# Patient Record
Sex: FEMALE | HISPANIC OR LATINO | Employment: FULL TIME | URBAN - METROPOLITAN AREA
[De-identification: names, ages, dates, MRNs, and addresses within clinical notes are randomized per-mention and may not be internally consistent; named-entity substitution may affect disease eponyms.]

---

## 2022-02-12 ENCOUNTER — HOSPITAL ENCOUNTER (EMERGENCY)
Facility: CLINIC | Age: 57
Discharge: HOME | End: 2022-02-12
Attending: EMERGENCY MEDICINE | Admitting: EMERGENCY MEDICINE
Payer: COMMERCIAL

## 2022-02-12 ENCOUNTER — APPOINTMENT (OUTPATIENT)
Dept: CT IMAGING | Facility: CLINIC | Age: 57
End: 2022-02-12
Attending: EMERGENCY MEDICINE
Payer: COMMERCIAL

## 2022-02-12 VITALS
OXYGEN SATURATION: 98 % | RESPIRATION RATE: 18 BRPM | SYSTOLIC BLOOD PRESSURE: 135 MMHG | DIASTOLIC BLOOD PRESSURE: 79 MMHG | TEMPERATURE: 97 F | HEART RATE: 66 BPM

## 2022-02-12 DIAGNOSIS — R10.13 EPIGASTRIC PAIN: ICD-10-CM

## 2022-02-12 LAB
ALBUMIN SERPL-MCNC: 3.8 G/DL (ref 3.4–5)
ALP SERPL-CCNC: 119 U/L (ref 40–150)
ALT SERPL W P-5'-P-CCNC: 41 U/L (ref 0–50)
ANION GAP SERPL CALCULATED.3IONS-SCNC: <1 MMOL/L (ref 3–14)
AST SERPL W P-5'-P-CCNC: 68 U/L (ref 0–45)
BASOPHILS # BLD AUTO: 0.1 10E3/UL (ref 0–0.2)
BASOPHILS NFR BLD AUTO: 1 %
BILIRUB DIRECT SERPL-MCNC: 0.2 MG/DL (ref 0–0.2)
BILIRUB SERPL-MCNC: 0.4 MG/DL (ref 0.2–1.3)
BUN SERPL-MCNC: 14 MG/DL (ref 7–30)
CALCIUM SERPL-MCNC: 9.2 MG/DL (ref 8.5–10.1)
CHLORIDE BLD-SCNC: 106 MMOL/L (ref 94–109)
CO2 SERPL-SCNC: 29 MMOL/L (ref 20–32)
CREAT SERPL-MCNC: 0.73 MG/DL (ref 0.52–1.04)
EOSINOPHIL # BLD AUTO: 0.1 10E3/UL (ref 0–0.7)
EOSINOPHIL NFR BLD AUTO: 1 %
ERYTHROCYTE [DISTWIDTH] IN BLOOD BY AUTOMATED COUNT: 12.3 % (ref 10–15)
GFR SERPL CREATININE-BSD FRML MDRD: >90 ML/MIN/1.73M2
GLUCOSE BLD-MCNC: 157 MG/DL (ref 70–99)
HCT VFR BLD AUTO: 40.8 % (ref 35–47)
HGB BLD-MCNC: 13.2 G/DL (ref 11.7–15.7)
IMM GRANULOCYTES # BLD: 0 10E3/UL
IMM GRANULOCYTES NFR BLD: 0 %
LYMPHOCYTES # BLD AUTO: 2 10E3/UL (ref 0.8–5.3)
LYMPHOCYTES NFR BLD AUTO: 26 %
MCH RBC QN AUTO: 29.3 PG (ref 26.5–33)
MCHC RBC AUTO-ENTMCNC: 32.4 G/DL (ref 31.5–36.5)
MCV RBC AUTO: 91 FL (ref 78–100)
MONOCYTES # BLD AUTO: 0.4 10E3/UL (ref 0–1.3)
MONOCYTES NFR BLD AUTO: 6 %
NEUTROPHILS # BLD AUTO: 5.1 10E3/UL (ref 1.6–8.3)
NEUTROPHILS NFR BLD AUTO: 66 %
NRBC # BLD AUTO: 0 10E3/UL
NRBC BLD AUTO-RTO: 0 /100
PLATELET # BLD AUTO: 210 10E3/UL (ref 150–450)
POTASSIUM BLD-SCNC: 4.4 MMOL/L (ref 3.4–5.3)
PROT SERPL-MCNC: 7.2 G/DL (ref 6.8–8.8)
RBC # BLD AUTO: 4.5 10E6/UL (ref 3.8–5.2)
SODIUM SERPL-SCNC: 135 MMOL/L (ref 133–144)
TROPONIN I SERPL HS-MCNC: 4 NG/L
TROPONIN I SERPL HS-MCNC: 5 NG/L
WBC # BLD AUTO: 7.7 10E3/UL (ref 4–11)

## 2022-02-12 PROCEDURE — 84484 ASSAY OF TROPONIN QUANT: CPT | Mod: 91 | Performed by: EMERGENCY MEDICINE

## 2022-02-12 PROCEDURE — 93005 ELECTROCARDIOGRAM TRACING: CPT

## 2022-02-12 PROCEDURE — 36415 COLL VENOUS BLD VENIPUNCTURE: CPT | Performed by: EMERGENCY MEDICINE

## 2022-02-12 PROCEDURE — 74176 CT ABD & PELVIS W/O CONTRAST: CPT

## 2022-02-12 PROCEDURE — 82435 ASSAY OF BLOOD CHLORIDE: CPT | Performed by: EMERGENCY MEDICINE

## 2022-02-12 PROCEDURE — 82248 BILIRUBIN DIRECT: CPT | Performed by: EMERGENCY MEDICINE

## 2022-02-12 PROCEDURE — 99285 EMERGENCY DEPT VISIT HI MDM: CPT | Mod: 25

## 2022-02-12 PROCEDURE — 85004 AUTOMATED DIFF WBC COUNT: CPT | Performed by: EMERGENCY MEDICINE

## 2022-02-12 RX ORDER — IOPAMIDOL 755 MG/ML
500 INJECTION, SOLUTION INTRAVASCULAR ONCE
Status: DISCONTINUED | OUTPATIENT
Start: 2022-02-12 | End: 2022-02-12 | Stop reason: CLARIF

## 2022-02-12 ASSESSMENT — ENCOUNTER SYMPTOMS
NAUSEA: 0
FEVER: 0
VOMITING: 0
DIAPHORESIS: 1
BACK PAIN: 1

## 2022-02-12 NOTE — ED TRIAGE NOTES
Pt arrives with c/o upper abdominal pain and chest pain that radiates into back that suddenly started at 1300 while at work. Pt reports gallbladder removal about 20 years ago. Pt denies nausea or vomiting. ABCs intact.

## 2022-02-12 NOTE — ED PROVIDER NOTES
History   Chief Complaint:  Abdominal Pain and Chest Pain       HPI   Janay Caldera is a 56 year old female with history of a cholecystectomy who presents with chest and back pain. 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 This episode was constant for 2 hours and her symptoms have slowly been improving since coming to the ED. She has never had symptoms this intense in the past and denies any heart issues. She states that she had her gallbladder removed and symptoms feel slightly similar. Also denies any nausea, fever, vomiting, or tobacco use. She is currently taking medications for blood pressure and cholesterol.     Review of Systems   Constitutional: Positive for diaphoresis. Negative for fever.   Cardiovascular: Positive for chest pain.   Gastrointestinal: Negative for nausea and vomiting.   Musculoskeletal: Positive for back pain.   All other systems reviewed and are negative.        Allergies:  Hydrocodone  Codeine    Medications:  Metformin   Metoprolol   Naproxen   Norvasc   Omeprazole   Pravastatin   Simethicone    Estradiol     Past Medical History:     Diabetes mellitus   Thyroid disease   Hypertension   Ovarian cyst   GERD   Vaginal atrophy   Asthma   Hiatal hernia   Hyperlipidemia   Myopia     Past Surgical History:    Tubal ligation   Cholecystectomy   Colonoscopy   Left wrist fracture repair   Finger trigger release   Carpal tunnel release     Family History:    Hypertension   Diabetes   Cataract   Stroke   Heart disease   Hyperlipidemia   Breast cancer    Social History:  Patient presents with her    Denies tobacco use     Physical Exam     Patient Vitals for the past 24 hrs:   BP Temp Temp src Pulse Resp SpO2   02/12/22 1745 135/79 -- -- 66 13 98 %   02/12/22 1730 125/89 -- -- 63 11 96 %   02/12/22 1615 130/86 -- -- 69 -- 95 %   02/12/22 1600 137/84 -- -- 69 --  94 %   02/12/22 1545 (!) 147/83 -- -- 66 -- 97 %   02/12/22 1530 (!) 142/87 -- -- 61 20 97 %   02/12/22 1500 137/82 -- -- 62 -- 96 %   02/12/22 1448 (!) 149/84 97  F (36.1  C) Temporal 62 18 99 %       Physical Exam  Gen: well appearing, in no acute distress  Oral : Mucous membranes moist,   Nose: No rhinorhea  Ears: External near normal, without drainage  Eyes: periorbital tissues and sclera normal   Neck: supple, no abnormal swelling  Lungs:  CTAB,  no resp distress, speaks full sentences  CV: Regular rate, regular rhythm  Abd: epigastric discomfort, soft, nontender, nondistended, no rebound/guarding  Ext: no lower extremity edema  Skin: warm, dry, well perfused, no rashes/bruising/lesions on exposed skin  Neuro: alert, no gross motor or sensory deficits,   Psych: pleasant mood, normal affect    Emergency Department Course   ECG  ECG obtained at 1458, ECG read at 1505  Sinus bradycardia    Rate 57 bpm. NC interval 174 ms. QRS duration 78 ms. QT/QTc 416/404 ms. P-R-T axes 38 34 35.     Imaging:  CT Abdomen Pelvis w/o Contrast   Preliminary Result   IMPRESSION:    1.  No acute abnormality in the abdomen or pelvis. No cause for epigastric pain is identified.   2.  There are two nonobstructing stones in the right kidney with the largest measuring 0.3 cm.           Report per radiology    Laboratory:  Labs Ordered and Resulted from Time of ED Arrival to Time of ED Departure   BASIC METABOLIC PANEL - Abnormal       Result Value    Sodium 135      Potassium 4.4      Chloride 106      Carbon Dioxide (CO2) 29      Anion Gap <1 (*)     Urea Nitrogen 14      Creatinine 0.73      Calcium 9.2      Glucose 157 (*)     GFR Estimate >90     HEPATIC FUNCTION PANEL - Abnormal    Bilirubin Total 0.4      Bilirubin Direct 0.2      Protein Total 7.2      Albumin 3.8      Alkaline Phosphatase 119      AST 68 (*)     ALT 41     TROPONIN I - Normal    Troponin I High Sensitivity 4     TROPONIN I - Normal    Troponin I High Sensitivity 5      CBC WITH PLATELETS AND DIFFERENTIAL    WBC Count 7.7      RBC Count 4.50      Hemoglobin 13.2      Hematocrit 40.8      MCV 91      MCH 29.3      MCHC 32.4      RDW 12.3      Platelet Count 210      % Neutrophils 66      % Lymphocytes 26      % Monocytes 6      % Eosinophils 1      % Basophils 1      % Immature Granulocytes 0      NRBCs per 100 WBC 0      Absolute Neutrophils 5.1      Absolute Lymphocytes 2.0      Absolute Monocytes 0.4      Absolute Eosinophils 0.1      Absolute Basophils 0.1      Absolute Immature Granulocytes 0.0      Absolute NRBCs 0.0          Emergency Department Course:  Reviewed:  I reviewed nursing notes, vitals, past medical history and Care Everywhere    Assessments:  1523 I obtained history and examined the patient as noted above.   1707 I rechecked the patient and explained findings.   1735 I rechecked the patient and explained findings.     Consults:  1635 I spoke with radiology about the patient's findings     Disposition:  The patient was discharged to home.     Impression & Plan     CMS Diagnoses: None    Medical Decision Making:  Janay Caldera is a 56 year old female who presents to the ED with an episode of epigastric discomfort. She reports it felt like when she had her gallbladder attack years ago. The symptoms are already resolving by the time I saw her. EKG shows no acute ischemic changes. She's not at high risk for ACS with an episode of pain that has resolved. She has had two negative troponins and non ischemic bearing EKG. I think outpatient follow up is appropriate. Will encourage outpatient follow up with PCP and the next week for recheck. Patient is amendable for discharge.         Diagnosis:    ICD-10-CM    1. Epigastric pain  R10.13        Discharge Medications:  New Prescriptions    No medications on file       Scribe Disclosure:  Milvia CAM, am serving as a scribe at 2:57 PM on 2/12/2022 to document services personally performed by Yinka Mendieta  MD Chele based on my observations and the provider's statements to me.          Yinka Mendieta MD  02/12/22 1942

## 2022-02-14 LAB
ATRIAL RATE - MUSE: 57 BPM
DIASTOLIC BLOOD PRESSURE - MUSE: NORMAL MMHG
INTERPRETATION ECG - MUSE: NORMAL
P AXIS - MUSE: 38 DEGREES
PR INTERVAL - MUSE: 174 MS
QRS DURATION - MUSE: 78 MS
QT - MUSE: 416 MS
QTC - MUSE: 404 MS
R AXIS - MUSE: 34 DEGREES
SYSTOLIC BLOOD PRESSURE - MUSE: NORMAL MMHG
T AXIS - MUSE: 35 DEGREES
VENTRICULAR RATE- MUSE: 57 BPM

## 2022-05-07 ENCOUNTER — HOSPITAL ENCOUNTER (EMERGENCY)
Facility: CLINIC | Age: 57
Discharge: HOME | End: 2022-05-07
Attending: EMERGENCY MEDICINE | Admitting: EMERGENCY MEDICINE
Payer: COMMERCIAL

## 2022-05-07 VITALS
DIASTOLIC BLOOD PRESSURE: 96 MMHG | WEIGHT: 167 LBS | OXYGEN SATURATION: 98 % | BODY MASS INDEX: 29.59 KG/M2 | HEIGHT: 63 IN | HEART RATE: 81 BPM | SYSTOLIC BLOOD PRESSURE: 149 MMHG | RESPIRATION RATE: 20 BRPM | TEMPERATURE: 98.6 F

## 2022-05-07 DIAGNOSIS — G89.29 CHRONIC NECK PAIN: ICD-10-CM

## 2022-05-07 DIAGNOSIS — M54.2 CHRONIC NECK PAIN: ICD-10-CM

## 2022-05-07 PROCEDURE — 99282 EMERGENCY DEPT VISIT SF MDM: CPT

## 2022-05-07 RX ORDER — CYCLOBENZAPRINE HCL 10 MG
10 TABLET ORAL 3 TIMES DAILY PRN
Qty: 20 TABLET | Refills: 0 | Status: SHIPPED | OUTPATIENT
Start: 2022-05-07 | End: 2022-05-13

## 2022-05-07 ASSESSMENT — ENCOUNTER SYMPTOMS: NECK PAIN: 1

## 2022-05-07 NOTE — ED TRIAGE NOTES
Pt presents for evaluation of upper back pain in to the shoulders, worse on the right. Neck pain at the base of the neck and a generalized headache, described as a stabbing pain. Symptoms have been present for the last month. Tylenol and salon-pas help with symptoms, last dose of tylenol was around 30 minutes PTA. Symptoms are constant other than when they are relieved a little bit with OTC remedies. Has an appointment for PT June 2 and neurology May 16, but couldn't wait, prompting pt to come in.

## 2022-05-07 NOTE — ED PROVIDER NOTES
History     Chief Complaint:  Back Pain, Neck Pain, and Headache      HPI   Janay Caldera is a 56 year old female who presents with ongoing posterior bilateral neck pain for at least the past month.  She denies any known injury.  She's been taking acetaminophen using OTC pain patches which somewhat help with the symptoms.  The pain does not radiate into the arms and no numbness or weakness in the arms.  She denies any fevers, chest or abd pain.  She saw her PCP who referred her to PMR but she can't get in until June 2nd.      Review of Systems   Musculoskeletal: Positive for neck pain.   All other systems reviewed and are negative.        Allergies:  Hydrocodone  Codeine  Latex  Lisinopril  Meperidine    Medications:    cyclobenzaprine (FLEXERIL) 10 MG tablet  ASPIRIN ADULT LOW STRENGTH PO  CALCIUM-MAGNESIUM-VITAMIN D PO  metFORMIN (GLUCOPHAGE) 1000 MG tablet  METOCLOPRAMIDE HCL PO  metoprolol (LOPRESSOR) 50 MG tablet  NAPROXEN 500 MG OR TABS  NORVASC 5 MG OR TABS  omeprazole (PRILOSEC) 20 MG capsule  Pomegranate, [place] granatum, (POMEGRANATE PO)  PRAVASTATIN SODIUM PO  Simethicone (GAS RELIEF) 180 MG CAPS  UNABLE TO FIND         Past Medical History:   Past Surgical History:     Past Medical History:   Diagnosis Date     Abdominal pain, generalized      Diabetes mellitus (H)      Thyroid disease      Unspecified essential hypertension     Past Surgical History:   Procedure Laterality Date     COLONOSCOPY N/A 9/11/2014    Procedure: COLONOSCOPY;  Surgeon: Savana Echols MD;  Location: [facility]     HC REMOVAL GALLBLADDER      Cholecystectomy     ORTHOPEDIC SURGERY      left wrist fx-metal     ZZC LIGATE FALLOPIAN TUBE,POSTPARTUM      Tubal Ligation      Patient Active Problem List    Diagnosis Date Noted     Essential hypertension, benign 03/31/2005     Priority: Medium     Backache 10/14/2003     Priority: Medium     Problem list name updated by automated process. Provider to review       Ovarian  "cyst 10/14/2003     Priority: Medium     Problem list name updated by automated process. Provider to review       Abdominal pain, right lower quadrant 10/14/2003     Priority: Medium          Family History  Family History   Problem Relation Age of Onset     Hypertension Father      Diabetes Father         adult  onset     Hypertension Mother      Diabetes Mother         adult onset     Diabetes Sister         adult onset     Diabetes Brother         adult onset       Social History:  Presents to the ED alone by private vehicle.    Physical Exam     Patient Vitals for the past 24 hrs:   BP Temp Temp src Pulse Resp SpO2 Height Weight   05/07/22 1305 (!) 149/96 98.6  F (37  C) Oral 81 20 98 % 1.6 m (5' 3\") 75.8 kg (167 lb)       Physical Exam  Vitals and nursing note reviewed.   Constitutional:       Appearance: Normal appearance.   HENT:      Head: Normocephalic and atraumatic.      Right Ear: External ear normal.      Left Ear: External ear normal.      Nose: Nose normal.      Mouth/Throat:      Mouth: Mucous membranes are moist.   Eyes:      Extraocular Movements: Extraocular movements intact.      Conjunctiva/sclera: Conjunctivae normal.   Cardiovascular:      Rate and Rhythm: Normal rate and regular rhythm.      Heart sounds: No murmur heard.  Pulmonary:      Effort: Pulmonary effort is normal. No respiratory distress.      Breath sounds: Normal breath sounds. No wheezing, rhonchi or rales.   Musculoskeletal:         General: No deformity or signs of injury.      Cervical back: Neck supple. Muscular tenderness present. No spinous process tenderness. Decreased range of motion.   Skin:     General: Skin is warm and dry.      Findings: No rash.   Neurological:      Mental Status: She is alert and oriented to person, place, and time.      Sensory: Sensation is intact.      Motor: No weakness.   Psychiatric:         Mood and Affect: Mood normal.         Behavior: Behavior normal.           Emergency Department Course "       Imaging:  No orders to display       Laboratory:  Labs Ordered and Resulted from Time of ED Arrival to Time of ED Departure - No data to display      Emergency Department Course:       Reviewed:  I reviewed nursing notes, vitals, past history and care everywhere    Interventions:  Medications - No data to display    Disposition:  The patient was discharged to home.    Impression & Plan      Medical Decision Makin yo F with ongoing bilateral neck pain for at least a month.  She has no new changes, just not getting better.  No known trauma.  No radicular symptoms or neuro deficits.  No fevers or infectious symptoms.  Suspect arthritis and/or degenerative disk disease etc.  Pt requesting an MRI but do not feel this is emergently indicated today as there are no neuro deficits and nothing to suggest a cord or nerve root compression, infection, or malignancy.  Offered XR but she declined.  Rec combining nsaids and acetaminophen, and will rx flexeril as well.  Rec close PCP f/u and continue with plan to f/u with PMR.      Covid-19  Janay Caldera was evaluated during a global COVID-19 pandemic, which necessitated consideration that the patient might be at risk for infection with the SARS-CoV-2 virus that causes COVID-19.  Applicable protocols for evaluation were followed during the patient's care. COVID-19 was considered as part of the patient's evaluation.       Diagnosis:    ICD-10-CM    1. Chronic neck pain  M54.2     G89.29        Discharge Medications:  New Prescriptions    CYCLOBENZAPRINE (FLEXERIL) 10 MG TABLET    Take 1 tablet (10 mg) by mouth 3 times daily as needed for muscle spasms              Michael Mendez MD  [phone]

## 2022-05-07 NOTE — DISCHARGE INSTRUCTIONS
Continue Tylenol and ibuprofen.  You can try the muscle relaxant (cyclobenzaprine) as need but it might make you sleepy.      Follow up with your doctor this week.

## 2025-04-14 ENCOUNTER — ANESTHESIA EVENT (OUTPATIENT)
Dept: SURGERY | Facility: CLINIC | Age: 60
End: 2025-04-14
Payer: COMMERCIAL

## 2025-04-14 ENCOUNTER — HOSPITAL ENCOUNTER (OUTPATIENT)
Facility: CLINIC | Age: 60
Discharge: HOME | End: 2025-04-14
Attending: STUDENT IN AN ORGANIZED HEALTH CARE EDUCATION/TRAINING PROGRAM | Admitting: INTERNAL MEDICINE
Payer: COMMERCIAL

## 2025-04-14 ENCOUNTER — APPOINTMENT (OUTPATIENT)
Dept: CT IMAGING | Facility: CLINIC | Age: 60
End: 2025-04-14
Attending: OTOLARYNGOLOGY
Payer: COMMERCIAL

## 2025-04-14 ENCOUNTER — APPOINTMENT (OUTPATIENT)
Dept: GENERAL RADIOLOGY | Facility: CLINIC | Age: 60
End: 2025-04-14
Attending: STUDENT IN AN ORGANIZED HEALTH CARE EDUCATION/TRAINING PROGRAM
Payer: COMMERCIAL

## 2025-04-14 ENCOUNTER — PREP FOR PROCEDURE (OUTPATIENT)
Dept: SURGERY | Facility: CLINIC | Age: 60
End: 2025-04-14
Payer: COMMERCIAL

## 2025-04-14 ENCOUNTER — ANESTHESIA (OUTPATIENT)
Dept: SURGERY | Facility: CLINIC | Age: 60
End: 2025-04-14
Payer: COMMERCIAL

## 2025-04-14 ENCOUNTER — HOSPITAL ENCOUNTER (OUTPATIENT)
Facility: CLINIC | Age: 60
End: 2025-04-14
Attending: OTOLARYNGOLOGY | Admitting: OTOLARYNGOLOGY
Payer: COMMERCIAL

## 2025-04-14 DIAGNOSIS — T18.108A FOREIGN BODY IN ESOPHAGUS, INITIAL ENCOUNTER: Primary | ICD-10-CM

## 2025-04-14 DIAGNOSIS — T18.108A ESOPHAGEAL FOREIGN BODY, INITIAL ENCOUNTER: Primary | ICD-10-CM

## 2025-04-14 LAB
GLUCOSE BLDC GLUCOMTR-MCNC: 125 MG/DL (ref 70–99)
UPPER EUS: NORMAL

## 2025-04-14 PROCEDURE — 70490 CT SOFT TISSUE NECK W/O DYE: CPT

## 2025-04-14 PROCEDURE — 272N000001 HC OR GENERAL SUPPLY STERILE: Performed by: OTOLARYNGOLOGY

## 2025-04-14 PROCEDURE — 70360 X-RAY EXAM OF NECK: CPT

## 2025-04-14 PROCEDURE — 258N000003 HC RX IP 258 OP 636: Performed by: ANESTHESIOLOGY

## 2025-04-14 PROCEDURE — 99285 EMERGENCY DEPT VISIT HI MDM: CPT | Mod: 25

## 2025-04-14 PROCEDURE — 250N000025 HC SEVOFLURANE, PER MIN: Performed by: OTOLARYNGOLOGY

## 2025-04-14 PROCEDURE — 250N000009 HC RX 250: Performed by: STUDENT IN AN ORGANIZED HEALTH CARE EDUCATION/TRAINING PROGRAM

## 2025-04-14 PROCEDURE — 250N000009 HC RX 250

## 2025-04-14 PROCEDURE — 999N000141 HC STATISTIC PRE-PROCEDURE NURSING ASSESSMENT: Performed by: OTOLARYNGOLOGY

## 2025-04-14 PROCEDURE — 710N000012 HC RECOVERY PHASE 2, PER MINUTE: Performed by: OTOLARYNGOLOGY

## 2025-04-14 PROCEDURE — 370N000017 HC ANESTHESIA TECHNICAL FEE, PER MIN: Performed by: OTOLARYNGOLOGY

## 2025-04-14 PROCEDURE — 710N000009 HC RECOVERY PHASE 1, LEVEL 1, PER MIN: Performed by: OTOLARYNGOLOGY

## 2025-04-14 PROCEDURE — 250N000011 HC RX IP 250 OP 636

## 2025-04-14 PROCEDURE — 96372 THER/PROPH/DIAG INJ SC/IM: CPT | Performed by: STUDENT IN AN ORGANIZED HEALTH CARE EDUCATION/TRAINING PROGRAM

## 2025-04-14 PROCEDURE — 360N000076 HC SURGERY LEVEL 3, PER MIN: Performed by: OTOLARYNGOLOGY

## 2025-04-14 PROCEDURE — 250N000011 HC RX IP 250 OP 636: Mod: JZ | Performed by: STUDENT IN AN ORGANIZED HEALTH CARE EDUCATION/TRAINING PROGRAM

## 2025-04-14 RX ORDER — HYDRALAZINE HYDROCHLORIDE 20 MG/ML
2.5-5 INJECTION INTRAMUSCULAR; INTRAVENOUS EVERY 10 MIN PRN
Status: DISCONTINUED | OUTPATIENT
Start: 2025-04-14 | End: 2025-04-14 | Stop reason: HOSPADM

## 2025-04-14 RX ORDER — FENTANYL CITRATE 50 UG/ML
50 INJECTION, SOLUTION INTRAMUSCULAR; INTRAVENOUS EVERY 5 MIN PRN
Status: DISCONTINUED | OUTPATIENT
Start: 2025-04-14 | End: 2025-04-14 | Stop reason: HOSPADM

## 2025-04-14 RX ORDER — GLYCOPYRROLATE 0.2 MG/ML
INJECTION, SOLUTION INTRAMUSCULAR; INTRAVENOUS PRN
Status: DISCONTINUED | OUTPATIENT
Start: 2025-04-14 | End: 2025-04-14

## 2025-04-14 RX ORDER — NALOXONE HYDROCHLORIDE 0.4 MG/ML
0.1 INJECTION, SOLUTION INTRAMUSCULAR; INTRAVENOUS; SUBCUTANEOUS
Status: DISCONTINUED | OUTPATIENT
Start: 2025-04-14 | End: 2025-04-14 | Stop reason: HOSPADM

## 2025-04-14 RX ORDER — LABETALOL HYDROCHLORIDE 5 MG/ML
5 INJECTION, SOLUTION INTRAVENOUS EVERY 5 MIN PRN
Status: DISCONTINUED | OUTPATIENT
Start: 2025-04-14 | End: 2025-04-14 | Stop reason: HOSPADM

## 2025-04-14 RX ORDER — SIMETHICONE 40MG/0.6ML
133 SUSPENSION, DROPS(FINAL DOSAGE FORM)(ML) ORAL
Status: DISCONTINUED | OUTPATIENT
Start: 2025-04-14 | End: 2025-04-14 | Stop reason: HOSPADM

## 2025-04-14 RX ORDER — PROPOFOL 10 MG/ML
INJECTION, EMULSION INTRAVENOUS PRN
Status: DISCONTINUED | OUTPATIENT
Start: 2025-04-14 | End: 2025-04-14

## 2025-04-14 RX ORDER — KETOROLAC TROMETHAMINE 30 MG/ML
30 INJECTION, SOLUTION INTRAMUSCULAR; INTRAVENOUS ONCE
Status: COMPLETED | OUTPATIENT
Start: 2025-04-14 | End: 2025-04-14

## 2025-04-14 RX ORDER — NALOXONE HYDROCHLORIDE 0.4 MG/ML
0.4 INJECTION, SOLUTION INTRAMUSCULAR; INTRAVENOUS; SUBCUTANEOUS
Status: DISCONTINUED | OUTPATIENT
Start: 2025-04-14 | End: 2025-04-14 | Stop reason: HOSPADM

## 2025-04-14 RX ORDER — DEXAMETHASONE SODIUM PHOSPHATE 4 MG/ML
4 INJECTION, SOLUTION INTRA-ARTICULAR; INTRALESIONAL; INTRAMUSCULAR; INTRAVENOUS; SOFT TISSUE
Status: DISCONTINUED | OUTPATIENT
Start: 2025-04-14 | End: 2025-04-14 | Stop reason: HOSPADM

## 2025-04-14 RX ORDER — ONDANSETRON 2 MG/ML
4 INJECTION INTRAMUSCULAR; INTRAVENOUS EVERY 30 MIN PRN
Status: DISCONTINUED | OUTPATIENT
Start: 2025-04-14 | End: 2025-04-14 | Stop reason: HOSPADM

## 2025-04-14 RX ORDER — FLUMAZENIL 0.1 MG/ML
0.2 INJECTION, SOLUTION INTRAVENOUS
Status: CANCELLED | OUTPATIENT
Start: 2025-04-14 | End: 2025-04-15

## 2025-04-14 RX ORDER — FENTANYL CITRATE 50 UG/ML
25 INJECTION, SOLUTION INTRAMUSCULAR; INTRAVENOUS EVERY 5 MIN PRN
Status: DISCONTINUED | OUTPATIENT
Start: 2025-04-14 | End: 2025-04-14 | Stop reason: HOSPADM

## 2025-04-14 RX ORDER — ONDANSETRON 4 MG/1
4 TABLET, ORALLY DISINTEGRATING ORAL EVERY 30 MIN PRN
Status: CANCELLED | OUTPATIENT
Start: 2025-04-14

## 2025-04-14 RX ORDER — HYDROMORPHONE HCL IN WATER/PF 6 MG/30 ML
0.2 PATIENT CONTROLLED ANALGESIA SYRINGE INTRAVENOUS EVERY 5 MIN PRN
Status: DISCONTINUED | OUTPATIENT
Start: 2025-04-14 | End: 2025-04-14 | Stop reason: HOSPADM

## 2025-04-14 RX ORDER — LIDOCAINE 40 MG/G
CREAM TOPICAL
Status: DISCONTINUED | OUTPATIENT
Start: 2025-04-14 | End: 2025-04-14 | Stop reason: HOSPADM

## 2025-04-14 RX ORDER — HYDROMORPHONE HCL IN WATER/PF 6 MG/30 ML
0.4 PATIENT CONTROLLED ANALGESIA SYRINGE INTRAVENOUS EVERY 5 MIN PRN
Status: DISCONTINUED | OUTPATIENT
Start: 2025-04-14 | End: 2025-04-14 | Stop reason: HOSPADM

## 2025-04-14 RX ORDER — ONDANSETRON 2 MG/ML
4 INJECTION INTRAMUSCULAR; INTRAVENOUS EVERY 30 MIN PRN
Status: CANCELLED | OUTPATIENT
Start: 2025-04-14

## 2025-04-14 RX ORDER — DEXAMETHASONE SODIUM PHOSPHATE 4 MG/ML
4 INJECTION, SOLUTION INTRA-ARTICULAR; INTRALESIONAL; INTRAMUSCULAR; INTRAVENOUS; SOFT TISSUE
Status: CANCELLED | OUTPATIENT
Start: 2025-04-14

## 2025-04-14 RX ORDER — NALOXONE HYDROCHLORIDE 0.4 MG/ML
0.2 INJECTION, SOLUTION INTRAMUSCULAR; INTRAVENOUS; SUBCUTANEOUS
Status: CANCELLED | OUTPATIENT
Start: 2025-04-14

## 2025-04-14 RX ORDER — FENTANYL CITRATE 50 UG/ML
INJECTION, SOLUTION INTRAMUSCULAR; INTRAVENOUS PRN
Status: DISCONTINUED | OUTPATIENT
Start: 2025-04-14 | End: 2025-04-14

## 2025-04-14 RX ORDER — ONDANSETRON 4 MG/1
4 TABLET, ORALLY DISINTEGRATING ORAL EVERY 30 MIN PRN
Status: DISCONTINUED | OUTPATIENT
Start: 2025-04-14 | End: 2025-04-14 | Stop reason: HOSPADM

## 2025-04-14 RX ORDER — LIDOCAINE HYDROCHLORIDE 20 MG/ML
INJECTION, SOLUTION INFILTRATION; PERINEURAL PRN
Status: DISCONTINUED | OUTPATIENT
Start: 2025-04-14 | End: 2025-04-14

## 2025-04-14 RX ORDER — FLUMAZENIL 0.1 MG/ML
0.2 INJECTION, SOLUTION INTRAVENOUS
Status: DISCONTINUED | OUTPATIENT
Start: 2025-04-14 | End: 2025-04-14 | Stop reason: HOSPADM

## 2025-04-14 RX ORDER — NALOXONE HYDROCHLORIDE 0.4 MG/ML
0.1 INJECTION, SOLUTION INTRAMUSCULAR; INTRAVENOUS; SUBCUTANEOUS
Status: CANCELLED | OUTPATIENT
Start: 2025-04-14

## 2025-04-14 RX ORDER — ACETAMINOPHEN 325 MG/1
975 TABLET ORAL
Status: CANCELLED | OUTPATIENT
Start: 2025-04-14

## 2025-04-14 RX ORDER — NALOXONE HYDROCHLORIDE 0.4 MG/ML
0.2 INJECTION, SOLUTION INTRAMUSCULAR; INTRAVENOUS; SUBCUTANEOUS
Status: DISCONTINUED | OUTPATIENT
Start: 2025-04-14 | End: 2025-04-14 | Stop reason: HOSPADM

## 2025-04-14 RX ORDER — ATROPINE SULFATE 0.1 MG/ML
1 INJECTION INTRAVENOUS
Status: DISCONTINUED | OUTPATIENT
Start: 2025-04-14 | End: 2025-04-14 | Stop reason: HOSPADM

## 2025-04-14 RX ORDER — OXYCODONE HYDROCHLORIDE 5 MG/1
10 TABLET ORAL
Status: CANCELLED | OUTPATIENT
Start: 2025-04-14

## 2025-04-14 RX ORDER — LIDOCAINE HYDROCHLORIDE 20 MG/ML
5 SOLUTION OROPHARYNGEAL ONCE
Status: COMPLETED | OUTPATIENT
Start: 2025-04-14 | End: 2025-04-14

## 2025-04-14 RX ORDER — SODIUM CHLORIDE, SODIUM LACTATE, POTASSIUM CHLORIDE, CALCIUM CHLORIDE 600; 310; 30; 20 MG/100ML; MG/100ML; MG/100ML; MG/100ML
INJECTION, SOLUTION INTRAVENOUS CONTINUOUS
Status: DISCONTINUED | OUTPATIENT
Start: 2025-04-14 | End: 2025-04-14 | Stop reason: HOSPADM

## 2025-04-14 RX ORDER — OMEGA-3 FATTY ACIDS/FISH OIL 300-1000MG
2 CAPSULE ORAL DAILY
COMMUNITY

## 2025-04-14 RX ORDER — DEXAMETHASONE SODIUM PHOSPHATE 4 MG/ML
INJECTION, SOLUTION INTRA-ARTICULAR; INTRALESIONAL; INTRAMUSCULAR; INTRAVENOUS; SOFT TISSUE PRN
Status: DISCONTINUED | OUTPATIENT
Start: 2025-04-14 | End: 2025-04-14

## 2025-04-14 RX ORDER — OXYCODONE HYDROCHLORIDE 5 MG/1
5 TABLET ORAL
Status: CANCELLED | OUTPATIENT
Start: 2025-04-14

## 2025-04-14 RX ORDER — DIPHENHYDRAMINE HYDROCHLORIDE 50 MG/ML
25-50 INJECTION, SOLUTION INTRAMUSCULAR; INTRAVENOUS
Status: DISCONTINUED | OUTPATIENT
Start: 2025-04-14 | End: 2025-04-14 | Stop reason: HOSPADM

## 2025-04-14 RX ORDER — KETOROLAC TROMETHAMINE 15 MG/ML
15 INJECTION, SOLUTION INTRAMUSCULAR; INTRAVENOUS
Status: DISCONTINUED | OUTPATIENT
Start: 2025-04-14 | End: 2025-04-14 | Stop reason: HOSPADM

## 2025-04-14 RX ORDER — EPINEPHRINE 1 MG/ML
0.1 INJECTION, SOLUTION, CONCENTRATE INTRAVENOUS
Status: DISCONTINUED | OUTPATIENT
Start: 2025-04-14 | End: 2025-04-14 | Stop reason: HOSPADM

## 2025-04-14 RX ORDER — NALOXONE HYDROCHLORIDE 0.4 MG/ML
0.4 INJECTION, SOLUTION INTRAMUSCULAR; INTRAVENOUS; SUBCUTANEOUS
Status: CANCELLED | OUTPATIENT
Start: 2025-04-14

## 2025-04-14 RX ORDER — ALBUTEROL SULFATE 0.83 MG/ML
2.5 SOLUTION RESPIRATORY (INHALATION) EVERY 4 HOURS PRN
Status: DISCONTINUED | OUTPATIENT
Start: 2025-04-14 | End: 2025-04-14 | Stop reason: HOSPADM

## 2025-04-14 RX ORDER — ONDANSETRON 2 MG/ML
INJECTION INTRAMUSCULAR; INTRAVENOUS PRN
Status: DISCONTINUED | OUTPATIENT
Start: 2025-04-14 | End: 2025-04-14

## 2025-04-14 RX ORDER — LIDOCAINE 40 MG/G
CREAM TOPICAL
Status: DISCONTINUED | OUTPATIENT
Start: 2025-04-14 | End: 2025-04-15 | Stop reason: HOSPADM

## 2025-04-14 RX ADMIN — LIDOCAINE HYDROCHLORIDE 5 ML: 20 SOLUTION ORAL at 16:12

## 2025-04-14 RX ADMIN — KETOROLAC TROMETHAMINE 30 MG: 30 INJECTION, SOLUTION INTRAMUSCULAR at 14:39

## 2025-04-14 RX ADMIN — ONDANSETRON 4 MG: 2 INJECTION INTRAMUSCULAR; INTRAVENOUS at 22:10

## 2025-04-14 RX ADMIN — SODIUM CHLORIDE, SODIUM LACTATE, POTASSIUM CHLORIDE, AND CALCIUM CHLORIDE: .6; .31; .03; .02 INJECTION, SOLUTION INTRAVENOUS at 21:57

## 2025-04-14 RX ADMIN — MIDAZOLAM 2 MG: 1 INJECTION INTRAMUSCULAR; INTRAVENOUS at 21:57

## 2025-04-14 RX ADMIN — FENTANYL CITRATE 50 MCG: 50 INJECTION INTRAMUSCULAR; INTRAVENOUS at 22:22

## 2025-04-14 RX ADMIN — SUGAMMADEX 200 MG: 100 INJECTION, SOLUTION INTRAVENOUS at 22:45

## 2025-04-14 RX ADMIN — DEXAMETHASONE SODIUM PHOSPHATE 8 MG: 4 INJECTION, SOLUTION INTRA-ARTICULAR; INTRALESIONAL; INTRAMUSCULAR; INTRAVENOUS; SOFT TISSUE at 22:10

## 2025-04-14 RX ADMIN — GLYCOPYRROLATE 0.2 MG: 0.2 INJECTION, SOLUTION INTRAMUSCULAR; INTRAVENOUS at 22:10

## 2025-04-14 RX ADMIN — PROPOFOL 150 MG: 10 INJECTION, EMULSION INTRAVENOUS at 22:10

## 2025-04-14 RX ADMIN — ROCURONIUM BROMIDE 50 MG: 50 INJECTION, SOLUTION INTRAVENOUS at 22:10

## 2025-04-14 RX ADMIN — LIDOCAINE HYDROCHLORIDE 50 MG: 20 INJECTION, SOLUTION INFILTRATION; PERINEURAL at 22:10

## 2025-04-14 RX ADMIN — FENTANYL CITRATE 50 MCG: 50 INJECTION INTRAMUSCULAR; INTRAVENOUS at 22:10

## 2025-04-14 ASSESSMENT — ACTIVITIES OF DAILY LIVING (ADL)
ADLS_ACUITY_SCORE: 41

## 2025-04-14 ASSESSMENT — COLUMBIA-SUICIDE SEVERITY RATING SCALE - C-SSRS
6. HAVE YOU EVER DONE ANYTHING, STARTED TO DO ANYTHING, OR PREPARED TO DO ANYTHING TO END YOUR LIFE?: NO
1. IN THE PAST MONTH, HAVE YOU WISHED YOU WERE DEAD OR WISHED YOU COULD GO TO SLEEP AND NOT WAKE UP?: NO
2. HAVE YOU ACTUALLY HAD ANY THOUGHTS OF KILLING YOURSELF IN THE PAST MONTH?: NO

## 2025-04-14 ASSESSMENT — LIFESTYLE VARIABLES: TOBACCO_USE: 1

## 2025-04-14 NOTE — ED PROVIDER NOTES
"                                  Emergency Department                         Assumed Care Note      Patient signed out to me by Dr Stephen.    Briefly, Janay Caldera is a 59 year old female is being evaluated for esophageal FB    BP (!) 183/104   Pulse 87   Temp 97.9  F (36.6  C) (Temporal)   Resp 18   Ht 1.6 m (5' 3\")   Wt 72.4 kg (159 lb 9.8 oz)   LMP 04/03/2004   SpO2 98%   BMI 28.27 kg/m      Thus far, studies reveal:     Labs Ordered and Resulted from Time of ED Arrival to Time of ED Departure - No data to display    Neck soft tissue XR   Final Result   IMPRESSION: Linear radiopaque density within the prevertebral space at the C5/C6 and C6/C7 levels, concerning for \"beef bone\" given patient history.  This measures at least 2.5 cm in length. No prevertebral edema. Normal appearance of the epiglottis and    larynx. No airway compromise. Postsurgical changes ACDF at C3/C4. Soft tissues unremarkable.      CT Soft Tissue Neck w/o Contrast    (Results Pending)       Pending studies include: ENT evaluation and CT soft tissue neck    Plan from sign out is: Disposition pending ENT evaluation.    Progress notes:  Dr. Brink evaluated patient and was unable to visualize esophageal foreign body.  CT soft tissue of the neck was obtained and plan was to transfer to the OR for further evaluation and intervention.    Clinical impression:  1. Food impaction of esophagus, initial encounter      Disposition:  Send to OR    FARHAN LOPEZ DO  4/14/2025     Farhan Lopez,   04/15/25 0109    "

## 2025-04-14 NOTE — ED PROVIDER NOTES
"  Emergency Department Note      History of Present Illness     Chief Complaint   Swallowed Foreign Body      HPI   Janay Caldera is a very pleasant 59 year old female with a history of type 2 diabetes, hypertension, hyperlipidemia presenting with swallowed foreign body. The patient reports today at 1330 she swallowed a \"beef bone\" that was in her soup. She states she feels it stuck and it hurts more when she swallows. The patient is able to breath and talk. She tried to drink water to get down and it did not. The patient notes that she had neck surgery 2 years ago and it \"hasn't been the same\". Janay has not taken anything for symptom relief.     Independent Historian   None    Review of External Notes   I personally reviewed notes from the patient's progress note dated  3/26/25 . This provided me with information regarding patient's baseline medical problems.     Past Medical History     Medical History and Problem List   GERD  Hypertension   Type 2 diabetes  Thyroid disease  Hyperlipidemia  Ovarian cyst   Low back pain   Abdominal pain   Hip pain  Spinal stenosis lumbar region  Spinal fusion  Myopia  Chest wall pain   Carpal tunnel syndrome    Medications   Albuterol  Estrace  Ativan  Mounjaro  Norvasc  Metformin  Prilosec  Pravachol  Lopressor  Naproxen    Surgical History   Colonoscopy  Cholecystectomy  Orthopedic surgery   Tubal ligation    Physical Exam     Patient Vitals for the past 24 hrs:   BP Temp Temp src Pulse Resp SpO2 Height Weight   04/14/25 1354 (!) 183/104 97.9  F (36.6  C) Temporal 87 18 98 % 1.6 m (5' 3\") 72.4 kg (159 lb 9.8 oz)     Physical Exam  Vitals and nursing note reviewed.   Constitutional:       General: She is in acute distress.      Appearance: Normal appearance. She is not ill-appearing or diaphoretic.   HENT:      Mouth/Throat:      Mouth: Mucous membranes are moist. No injury, lacerations or angioedema.      Pharynx: Oropharynx is clear. No pharyngeal swelling, oropharyngeal " "exudate or posterior oropharyngeal erythema.   Eyes:      Conjunctiva/sclera: Conjunctivae normal.   Cardiovascular:      Rate and Rhythm: Normal rate.   Pulmonary:      Effort: Pulmonary effort is normal.      Breath sounds: Normal breath sounds.   Skin:     General: Skin is warm and dry.   Neurological:      General: No focal deficit present.      Mental Status: She is alert and oriented to person, place, and time.   Psychiatric:         Mood and Affect: Mood normal.         Behavior: Behavior normal.         Thought Content: Thought content normal.         Judgment: Judgment normal.         Diagnostics     Lab Results   Labs Ordered and Resulted from Time of ED Arrival to Time of ED Departure - No data to display    Imaging   Neck soft tissue XR   Final Result   IMPRESSION: Linear radiopaque density within the prevertebral space at the C5/C6 and C6/C7 levels, concerning for \"beef bone\" given patient history.  This measures at least 2.5 cm in length. No prevertebral edema. Normal appearance of the epiglottis and    larynx. No airway compromise. Postsurgical changes ACDF at C3/C4. Soft tissues unremarkable.          EKG   No ECG performed.     Independent Interpretation   1452 Neck soft tissue XR  Independent interpretation: possible foreign body around larynx?>.         ED Course      Medications Administered   Medications   benzocaine 20% (HURRICAINE/TOPEX) 20 % spray 1 mL (has no administration in time range)   ketorolac (TORADOL) injection 30 mg (30 mg Intramuscular $Given 4/14/25 1439)       Procedures   Procedures   None performed    Discussion of Management   1432 I spoke with , Gastroenterology, regarding the patient's presentation, findings, and plan of care.     1549 I spoke with Dr Brink of ENT, who will evaluate the patient with endoscopy at bedside.  Patient will likely require management in the OR if foreign body is confirmed       ED Course   ED Course as of 04/14/25 1600   Mon Apr 14, 2025 "   1428 I obtained history and examined the patient as noted above    1432 I spoke with , Gastroenterology, regarding the patient's presentation, findings, and plan of care.     1452 Neck soft tissue XR  Independent interpretation: possible foreign body around larynx?>.     1549 I spoke with Dr Brink of ENT, who will evaluate the patient with endoscopy at bedside.  Patient will likely require management in the OR if foreign body is confirmed       Additional Documentation  None    Medical Decision Making / Diagnosis     CMS Diagnoses: None    MIPS       None    MDM   Patient with concern for foreign body in throat.  Given history, I did obtain x-ray of the neck.  This does appear to show foreign body in the location of patient's discomfort.  I discussed with GI, who evaluated the patient.  They recommended I discuss with ENT.  I did treat the patient with ketorolac, with some improvement in her pain.  I then discussed with Dr. Brink of ENT and he will come and evaluate the patient with endoscopy at bedside.    Disposition   Care of the patient was transferred to my colleague Dr. Lopez pending ENT eval and final plan.     Diagnosis     ICD-10-CM    1. Food impaction of esophagus, initial encounter  T18.128A     W44.F3XA            Discharge Medications   New Prescriptions    No medications on file     SCRIBE DISCLOSURE   I, Marc Vazquez, am serving as a scribe at 11:17 AM on 4/14/2025 to document services personally performed by Patrick Stephen MD based on my observations and the provider's statements to me.        Patrick Stephen MD  04/14/25 4119

## 2025-04-14 NOTE — PROGRESS NOTES
"[facility]  ED Nurse Handoff Report    ED Chief complaint: Swallowed Foreign Body  . ED Diagnosis:   Final diagnoses:   Foreign body in esophagus, initial encounter       Allergies:   Allergies   Allergen Reactions    Hydrocodone Nausea and Vomiting and Diarrhea    Latex Unknown    Lisinopril Cough    Meperidine     Morphine And Codeine Itching       Code Status: Full Code    Activity level - Baseline/Home:  independent.  Activity Level - Current:   independent.   Lift room needed: No.   Bariatric: No   Needed: No   Isolation: No.   Infection: Not Applicable.     Respiratory status: Room air    Vital Signs (within 30 minutes):   Vitals:    04/14/25 1354   BP: (!) 183/104   Pulse: 87   Resp: 18   Temp: 97.9  F (36.6  C)   TempSrc: Temporal   SpO2: 98%   Weight: 72.4 kg (159 lb 9.8 oz)   Height: 1.6 m (5' 3\")       Cardiac Rhythm:  ,      Pain level:    Patient confused: No.   Patient Falls Risk: nonskid shoes/slippers when out of bed.   Elimination Status: Has voided     Patient Report - Initial Complaint: Bone stuck in throat.   Focused Assessment: Pt found to have beef bone, stuck in her throat.      Abnormal Results:   Labs Ordered and Resulted from Time of ED Arrival to Time of ED Departure - No data to display     Neck soft tissue XR   Final Result   IMPRESSION: Linear radiopaque density within the prevertebral space at the C5/C6 and C6/C7 levels, concerning for \"beef bone\" given patient history.  This measures at least 2.5 cm in length. No prevertebral edema. Normal appearance of the epiglottis and    larynx. No airway compromise. Postsurgical changes ACDF at C3/C4. Soft tissues unremarkable.      CT Soft Tissue Neck w/o Contrast    (Results Pending)       Treatments provided: Imaging, monitoring, medications.  Family Comments: At bedside.  OBS brochure/video discussed/provided to patient:  No  ED Medications:   Medications   benzocaine 20% (HURRICAINE/TOPEX) 20 % spray 1 mL " (has no administration in time range)   ketorolac (TORADOL) injection 30 mg (30 mg Intramuscular $Given 4/14/25 5833)   lidocaine (viscous) (XYLOCAINE) 2 % solution 5 mL (5 mLs Mouth/Throat $Given 4/14/25 1612)       Drips infusing:  No  For the majority of the shift this patient was Green.   Interventions performed were NA.    Sepsis treatment initiated: No    Cares/treatment/interventions/medications to be completed following ED care: To OR for ENT.    ED Nurse Name: Roseanne Honeycutt RN  6:31 PM

## 2025-04-14 NOTE — ED TRIAGE NOTES
Patient reports swallowing beef bone that was in soup today.  ABCs intact, able to speak in full sentences.       Triage Assessment (Adult)       Row Name 04/14/25 8688          Triage Assessment    Airway WDL X        Respiratory WDL    Respiratory WDL WDL        Skin Circulation/Temperature WDL    Skin Circulation/Temperature WDL WDL        Cardiac WDL    Cardiac WDL WDL        Peripheral/Neurovascular WDL    Peripheral Neurovascular WDL WDL        Cognitive/Neuro/Behavioral WDL    Cognitive/Neuro/Behavioral WDL WDL

## 2025-04-14 NOTE — CONSULTS
[facility]  Otolaryngology Consultation         Toby Brink MD    Janay aCldera MRN# [id number]   YOB: 1965 Age: 59 year old      Date of Admission:  4/14/2025  Date of Consult: 4/14/2025         Assessment and Plan:   Esophageal FB  - suspect at level of cricopharyngeus.  Dr. Mejía kindly agreed to perform the direct laryngoscopy and esophagoscopy with removal of foreign body.  The operating room has availability at 9 PM tonight.  The risks and benefits were discussed with the patient and .  Before surgery she will have a CT scan of the neck to further determine the extent and location and size of the foreign body and potentially confirm that it is still in this location.              Requesting Physician:      Dr. Stephen         Chief Complaint:   Throat pain, suspected esophageal FB    History is obtained from the patient         History of Present Illness:   Janay Caldera is a 59 year old female who presented with pain in the throat and dysphagia and odynophagia.  She prepared a beef stew with bones and then had a bowl of the soup today at 1 PM and had sudden onset of severe throat pain while having the soup.  She believes she swallowed one of the bones from the soup.  Since that time she has had severe pain with swallowing.  She denies any trouble with hoarseness, breathing, or coughing.  Came to emergency department had a plain film of the neck which shows a 2.5 cm suspected bone of the cervical esophagus.           Past Medical History:     Past Medical History:   Diagnosis Date    Abdominal pain, generalized     Diabetes mellitus (H)     Thyroid disease     Unspecified essential hypertension                Past Surgical History:     Past Surgical History:   Procedure Laterality Date    COLONOSCOPY N/A 9/11/2014    Procedure: COLONOSCOPY;  Surgeon: Savana Echols MD;  Location: [facility]    HC REMOVAL GALLBLADDER       Cholecystectomy    ORTHOPEDIC SURGERY      left wrist fx-metal    ZZC LIGATE FALLOPIAN TUBE,POSTPARTUM      Tubal Ligation              Home Medications:     Prior to Admission medications    Medication Sig Last Dose Taking? Auth Provider Long Term End Date   ASPIRIN ADULT LOW STRENGTH PO    Reported, Patient     CALCIUM-MAGNESIUM-VITAMIN D PO Take  by mouth. Take one tab 2-3 times per day   Reported, Patient     metFORMIN (GLUCOPHAGE) 1000 MG tablet Take 1,000 mg by mouth 2 times daily (with meals).    Reported, Patient     METOCLOPRAMIDE HCL PO Take 10 mg by mouth 4 times daily (before meals and nightly)   Reported, Patient     metoprolol (LOPRESSOR) 50 MG tablet Take 1 tablet (50 mg) by mouth See Admin Instructions   Reena Laguerre MD Yes    NAPROXEN 500 MG OR TABS 1 TABLET TWICE DAILY   FER Trevino MD     NORVASC 5 MG OR TABS 1 tab PO QD (Once per day)   FER Trevino MD     omeprazole (PRILOSEC) 20 MG capsule Take  by mouth daily. Take one daily   Reported, Patient     Pomegranate, Punica granatum, (POMEGRANATE PO) Take  by mouth. 2 oz every morning   Reported, Patient     PRAVASTATIN SODIUM PO Take 40 mg by mouth daily   Reported, Patient Yes    Simethicone (GAS RELIEF) 180 MG CAPS Take one daily as needed.    Reported, Patient     UNABLE TO FIND MEDICATION NAME: reishi mushroom, dietary supplement   Reported, Patient              Current Medications:         Current Facility-Administered Medications   Medication Dose Route Frequency Provider Last Rate Last Admin    benzocaine 20% (HURRICAINE/TOPEX) 20 % spray 1 mL  2 spray Mouth/Throat Once Patrick Stephen MD         Current Facility-Administered Medications   Medication Dose Route Frequency Provider Last Rate Last Admin            Allergies:     Allergies   Allergen Reactions    Hydrocodone Nausea and Vomiting and Diarrhea    Latex Unknown    Lisinopril Cough    Meperidine     Morphine And Codeine Itching            Social History:   Janay KRAMER  "Werner  reports that she has quit smoking. She has never used smokeless tobacco. She reports current alcohol use. She reports that she does not use drugs.            Family History:     Family History   Problem Relation Age of Onset    Hypertension Father     Diabetes Father         adult  onset    Hypertension Mother     Diabetes Mother         adult onset    Diabetes Sister         adult onset    Diabetes Brother         adult onset               Review of Systems:   The 10 point Review of Systems is negative other than noted in the HPI or here.             Physical Exam:    , Blood pressure (!) 183/104, pulse 87, temperature 97.9  F (36.6  C), temperature source Temporal, resp. rate 18, height 1.6 m (5' 3\"), weight 72.4 kg (159 lb 9.8 oz), last menstrual period 04/03/2004, SpO2 98%, not currently breastfeeding.  159 lbs 9.81 oz    She is in general is awake and alert and breathing quietly no stridor or stridor.  No hoarseness.  Her  is at bedside.  Nose without rhinorrhea.  Midline septum,  Oral cavity oropharynx shows small tonsils no signs of redness or bulging normal dentition.  Neck is soft no adenopathy no crepitus, scar from previous cervical spine surgery.    Flexible Fiberoptic Laryngoscopy  DESCRIPTION OF PROCEDURE  ANESTHESIA: Topical Lidocaine and Phenylephrine  FINDINGS:  Nasal cavities: patent and normal.    Nasopharynx examination: Normal   Base of tongue, Pharyngeal walls, Vallecula and Pyriform Sinuses: Normal  Larynx: Normal, no edema, vocal cords symmetrically mobile  Subglottis and trachea: Normal  PROCEDURE: Risks and benefits were discussed and verbal consent provided by the patient. After vasoconstriction and topical anesthesia was established, the endoscope was introduced into the nasal airway and structures from the nares to the posterior choanae were evaluated. The findings are noted above.  TOLERANCE: Good  ESTIMATED BLOOD LOSS: nil          Data:   All new lab and imaging data " "was reviewed.   Recent Labs   Lab Test 02/12/22  1458   WBC 7.7   HGB 13.2   MCV 91         Recent Labs   Lab Test 02/12/22  1458      POTASSIUM 4.4   CHLORIDE 106   CO2 29   BUN 14   CR 0.73   ANIONGAP <1*   RAEGAN 9.2   *     Narrative & Impression   EXAM: XR NECK SOFT TISSUE  LOCATION: [facility]  DATE: 4/14/2025     INDICATION: foreign body sensation (beef bone) around larynx  COMPARISON: October 5, 2022  TECHNIQUE: CR Neck Soft Tissue.                                                                      IMPRESSION: Linear radiopaque density within the prevertebral space at the C5/C6 and C6/C7 levels, concerning for \"beef bone\" given patient history.  This measures at least 2.5 cm in length. No prevertebral edema. Normal appearance of the epiglottis and   larynx. No airway compromise. Postsurgical changes ACDF at C3/C4. Soft tissues unremarkable.     Toby Brink M.D.  [facility]  Office [phone]  Pager [phone]       "

## 2025-04-15 VITALS
TEMPERATURE: 97 F | BODY MASS INDEX: 28.28 KG/M2 | OXYGEN SATURATION: 94 % | RESPIRATION RATE: 18 BRPM | HEART RATE: 71 BPM | DIASTOLIC BLOOD PRESSURE: 84 MMHG | WEIGHT: 159.61 LBS | HEIGHT: 63 IN | SYSTOLIC BLOOD PRESSURE: 136 MMHG

## 2025-04-15 ASSESSMENT — ACTIVITIES OF DAILY LIVING (ADL)
ADLS_ACUITY_SCORE: 41
ADLS_ACUITY_SCORE: 41

## 2025-04-15 NOTE — ANESTHESIA CARE TRANSFER NOTE
Patient: Janay Caldera    Procedure: Procedure(s):  LARYNGOSCOPY  ESOPHAGOSCOPY with foreign body removal  Gastroscopy       Diagnosis: Esophageal foreign body, initial encounter [T18.108A]  Diagnosis Additional Information: No value filed.    Anesthesia Type:   General     Note:    Oropharynx: oropharynx clear of all foreign objects  Level of Consciousness: drowsy  Oxygen Supplementation: face mask  Level of Supplemental Oxygen (L/min / FiO2): 6  Independent Airway: airway patency satisfactory and stable  Dentition: dentition unchanged  Vital Signs Stable: post-procedure vital signs reviewed and stable  Report to RN Given: handoff report given  Patient transferred to: PACU    Handoff Report: Identifed the Patient, Identified the Reponsible Provider, Reviewed the pertinent medical history, Discussed the surgical course, Reviewed Intra-OP anesthesia mangement and issues during anesthesia, Set expectations for post-procedure period and Allowed opportunity for questions and acknowledgement of understanding      Vitals:  Vitals Value Taken Time   BP     Temp     Pulse     Resp     SpO2         Electronically Signed By: JAGDISH Herndon CRNA  April 14, 2025  10:57 PM

## 2025-04-15 NOTE — PROCEDURES
Pre-operative diagnosis: Esophageal foreign body   Post-operative diagnosis: Same    Procedure: Direct laryngoscopy, rigid esophagoscopy   Surgeon: Handy Mejía MD   Assistant(s): None   Anesthesia: General    Estimated blood loss: None      Specimens: Foreign body      INDICATION FOR OPERATION:  59 year old female patient who presented earlier today with pain in the throat, dysphagia and odynophagia.  She prepared a beef stew with bones and then had a bowl of the soup today at 1 PM and developed sudden onset of severe throat pain while having the soup.  She believes she swallowed one of the bones from the soup and continues with severe pain with swallowing.  She denies any trouble with hoarseness, breathing, or coughing.  In the ED, neck xray followed by CT neck confirmed a bony foreign body lodged in the upper esophagus.  With concerns of prior cervical spine surgery and recent concerns about spinal canal impingement, we discussed challenges with the operation in that no neck extension will be possible so in addition, patient met with gastroenterologist on call dustin, Dr. Villanueva, with plans to proceed with possible EGD if FB not retrievable by rigid laryngoscopy and esophagoscopy.  Risks, goals, benefits and alternatives were reviewed with her and her .  Spinal injury, bleeding, infection, tooth injury, perforation, and tongue numbness were discussed.  She verbalized understanding and gave consent to proceed.        DESCRIPTION OF PROCEDURE:  After uneventful intubation by the anesthesiologist with the glide scope, patient was draped in the usual fashion and head/neck were kept in neutral position the entire time.  Foam padded around the head allowed for no mobility.  Maxillary tooth guard was placed and the rigid laryngoscopy was used to examine the pharynx and larynx with no FB identified.  There was pooling of thick mucus in the hypopharynx.  Next, short rigid esophagoscope was introduced and  passed easily through the esophageal introitus with no neck extension required and no resistance encountered.  The length of the esophagus was examined and the FB was at the distal aspect but could not be removed with the scope in place given neck mobility concerns so at this point, the scope was withdrawn.  Maxillary tooth guard was removed and teeth were intact.  Dr. Villanueva then proceed with EGD and was able to removed the FB with basket (see his operative note).  The patient tolerated the procedure well.  All counts were correct at the end of the case.      FINDINGS:  See additional GI note.      Handy Mejía MD

## 2025-04-15 NOTE — ANESTHESIA PROCEDURE NOTES
Airway       Patient location during procedure: OR       Procedure Start/Stop Times: 4/14/2025 10:13 PM  Staff -        CRNA: Laura Cardona APRN CRNA       Performed By: CRNA  Consent for Airway        Urgency: elective  Indications and Patient Condition       Indications for airway management: rosa-procedural       Induction type:intravenous       Mask difficulty assessment: 2 - vent by mask + OA or adjuvant +/- NMBA    Final Airway Details       Final airway type: endotracheal airway       Successful airway: ETT - single  Endotracheal Airway Details        Cuffed: yes       Successful intubation technique: video laryngoscopy       VL Blade Size: Glidescope 3       Grade View of Cords: 1       Adjucts: stylet       Position: Right       Measured from: gums/teeth       Secured at (cm): 21       Bite block used: None    Post intubation assessment        Placement verified by: capnometry, equal breath sounds and chest rise        Number of attempts at approach: 1       Number of other approaches attempted: 0       Secured with: plastic tape       Ease of procedure: easy       Dentition: Unchanged    Medication(s) Administered   Medication Administration Time: 4/14/2025 10:13 PM

## 2025-04-15 NOTE — ANESTHESIA PREPROCEDURE EVALUATION
Anesthesia Pre-Procedure Evaluation    Patient: Janay Caldera   MRN: [id number] : 1965        Procedure : Procedure(s):  LARYNGOSCOPY  ESOPHAGOSCOPY with foreign body removal          Past Medical History:   Diagnosis Date     Abdominal pain, generalized      Diabetes mellitus (H)      Thyroid disease      Unspecified essential hypertension       Past Surgical History:   Procedure Laterality Date     COLONOSCOPY N/A 2014    Procedure: COLONOSCOPY;  Surgeon: Savana Echols MD;  Location: [facility]     HC REMOVAL GALLBLADDER      Cholecystectomy     ORTHOPEDIC SURGERY      left wrist fx-metal     ZZC LIGATE FALLOPIAN TUBE,POSTPARTUM      Tubal Ligation      Allergies   Allergen Reactions     Hydrocodone Nausea and Vomiting and Diarrhea     Latex Unknown     Lisinopril Cough     Meperidine      Morphine And Codeine Itching      Social History     Tobacco Use     Smoking status: Former     Smokeless tobacco: Never     Tobacco comments:     2 cigs/d for 8 years   Substance Use Topics     Alcohol use: Yes     Comment: occasionally, one-two drinks every two weeks      Wt Readings from Last 1 Encounters:   25 72.4 kg (159 lb 9.8 oz)        Anesthesia Evaluation   Pt has had prior anesthetic.     No history of anesthetic complications       ROS/MED HX  ENT/Pulmonary:     (+)                tobacco use, Past use,                       Neurologic: Comment: Previous Cervical fusion with cervical disc herniation at C5-6 and severe central canal stenosis    (+)    peripheral neuropathy,                            Cardiovascular:     (+)  hypertension- -   -  - -                                      METS/Exercise Tolerance:     Hematologic:       Musculoskeletal:       GI/Hepatic:     (+) GERD, Asymptomatic on medication,                  Renal/Genitourinary:       Endo:     (+)  type II DM,        thyroid problem,            Psychiatric/Substance Use:       Infectious Disease:       Malignancy:   "     Other:            Physical Exam    Airway        Mallampati: II   TM distance: > 3 FB   Neck ROM: limited   Mouth opening: > 3 cm    Respiratory Devices and Support         Dental       (+) Minor Abnormalities - some fillings, tiny chips      Cardiovascular             Pulmonary               Other findings: Significant worsening arm pain with turning head to the right    OUTSIDE LABS:  CBC:   Lab Results   Component Value Date    WBC 7.7 02/12/2022    WBC 5.5 02/04/2004    HGB 13.2 02/12/2022    HGB 14.0 02/04/2004    HCT 40.8 02/12/2022    HCT 42.8 02/04/2004     02/12/2022     02/04/2004     BMP:   Lab Results   Component Value Date     02/12/2022     03/31/2005    POTASSIUM 4.4 02/12/2022    POTASSIUM 4.5 03/31/2005    CHLORIDE 106 02/12/2022    CHLORIDE 104 03/31/2005    CO2 29 02/12/2022    CO2 25 03/31/2005    BUN 14 02/12/2022    BUN 12 03/31/2005    CR 0.73 02/12/2022    CR 0.80 03/31/2005     (H) 02/12/2022     03/31/2005     COAGS: No results found for: \"PTT\", \"INR\", \"FIBR\"  POC:   Lab Results   Component Value Date     (H) 08/24/2013     HEPATIC:   Lab Results   Component Value Date    ALBUMIN 3.8 02/12/2022    PROTTOTAL 7.2 02/12/2022    ALT 41 02/12/2022    AST 68 (H) 02/12/2022    ALKPHOS 119 02/12/2022    BILITOTAL 0.4 02/12/2022     OTHER:   Lab Results   Component Value Date    RAEGAN 9.2 02/12/2022    TSH 1.91 08/16/2013    T4 1.09 08/23/2004       Anesthesia Plan    ASA Status:  3    NPO Status:  NPO Appropriate    Anesthesia Type: General.     - Airway: ETT   Induction: Intravenous, Propofol.   Maintenance: Balanced.   Techniques and Equipment:     - Airway: Video-Laryngoscope       Consents    Anesthesia Plan(s) and associated risks, benefits, and realistic alternatives discussed. Questions answered and patient/representative(s) expressed understanding.     - Discussed:     - Discussed with:  Patient      - Specific Concerns: Worsening carvical " "spinal cord compression with intubation or procedure..           Postoperative Care    Pain management: IV analgesics.   PONV prophylaxis: Ondansetron (or other 5HT-3), Dexamethasone or Solumedrol     Comments:               Phil Noguera MD    Clinically Significant Risk Factors Present on Admission                   # Hypertension: Noted on problem list           # Overweight: Estimated body mass index is 28.27 kg/m  as calculated from the following:    Height as of this encounter: 1.6 m (5' 3\").    Weight as of this encounter: 72.4 kg (159 lb 9.8 oz).                "

## 2025-04-15 NOTE — DISCHARGE INSTRUCTIONS
Follow up with your neck doctor/orthopaedic.  If symptoms become worse, numbness, tingling of extremities lack of mobility,   contact Urgent Care or Emergency room for treatment.

## 2025-04-15 NOTE — ANESTHESIA POSTPROCEDURE EVALUATION
Patient: Janay Caldera    Procedure: Procedure(s):  LARYNGOSCOPY  ESOPHAGOSCOPY with foreign body removal  Gastroscopy       Anesthesia Type:  General    Note:  Disposition: Outpatient   Postop Pain Control: Uneventful            Sign Out: Well controlled pain   PONV: No   Neuro/Psych: Uneventful            Sign Out: Acceptable/Baseline neuro status   Airway/Respiratory: Uneventful            Sign Out: Acceptable/Baseline resp. status   CV/Hemodynamics: Uneventful            Sign Out: Acceptable CV status; No obvious hypovolemia; No obvious fluid overload   Other NRE: NONE   DID A NON-ROUTINE EVENT OCCUR? No    Event details/Postop Comments:  Patient denies worsening neck, back, or arm symptom. Similar or better compared to pre-op. Encouraged to follow up with her neck specialist ASAP given her worsening symptoms over the past 2 weeks.           Last vitals:  Vitals Value Taken Time   /84 04/14/25 2325   Temp 97  F (36.1  C) 04/14/25 2255   Pulse 65 04/14/25 2330   Resp 16 04/14/25 2330   SpO2 91 % 04/14/25 2330   Vitals shown include unfiled device data.    Electronically Signed By: Phil Noguera MD  April 14, 2025  11:31 PM

## 2025-04-15 NOTE — H&P
Pre-Endoscopy History and Physical     Janay Caldera MRN# [id number]   YOB: 1965 Age: 59 year old     Date of Procedure: 4/14/2025  Primary care provider: [facility], [facility] [place]  Type of Endoscopy: Esophagogastroduodenoscopy with possible biopsy, possible dilation, possible foreign body removal  Reason for Procedure: foreign body  Type of Anesthesia Anticipated: General Anesthesia    HPI:    Janay is a 59 year old female who will be undergoing the above procedure.      A history and physical has been performed. The patient's medications and allergies have been reviewed. The risks and benefits of the procedure and the sedation options and risks were discussed with the patient.  All questions were answered and informed consent was obtained.      She denies a personal or family history of anesthesia complications or bleeding disorders.     Patient Active Problem List   Diagnosis    Backache    Ovarian cyst    Abdominal pain, right lower quadrant    Essential hypertension, benign        Past Medical History:   Diagnosis Date    Abdominal pain, generalized     Diabetes mellitus (H)     Thyroid disease     Unspecified essential hypertension         Past Surgical History:   Procedure Laterality Date    COLONOSCOPY N/A 9/11/2014    Procedure: COLONOSCOPY;  Surgeon: Savana Echols MD;  Location: [facility]    HC REMOVAL GALLBLADDER      Cholecystectomy    ORTHOPEDIC SURGERY      left wrist fx-metal    ZZC LIGATE FALLOPIAN TUBE,POSTPARTUM      Tubal Ligation       Social History     Tobacco Use    Smoking status: Former    Smokeless tobacco: Never    Tobacco comments:     2 cigs/d for 8 years   Substance Use Topics    Alcohol use: Yes     Comment: occasionally, one-two drinks every two weeks       Family History   Problem Relation Age of Onset    Hypertension Father     Diabetes Father         adult  onset    Hypertension Mother     Diabetes Mother         adult onset    Diabetes  "Sister         adult onset    Diabetes Brother         adult onset       Prior to Admission medications    Medication Sig Start Date End Date Taking? Authorizing Provider   CALCIUM-MAGNESIUM-VITAMIN D PO Take  by mouth. Take one tab 2-3 times per day   Yes Reported, Patient   metFORMIN (GLUCOPHAGE) 1000 MG tablet Take 1,000 mg by mouth 2 times daily (with meals).    Yes Reported, Patient   NORVASC 5 MG OR TABS 1 tab PO QD (Once per day) 3/31/05  Yes FER Trevino MD   omega 3 1000 MG CAPS Take 2 g by mouth daily.   Yes Reported, Patient   omeprazole (PRILOSEC) 20 MG capsule Take  by mouth daily. Take one daily   Yes Reported, Patient   PRAVASTATIN SODIUM PO Take 40 mg by mouth daily   Yes Reported, Patient       Allergies   Allergen Reactions    Hydrocodone Nausea and Vomiting and Diarrhea    Latex Unknown    Lisinopril Cough    Meperidine     Morphine And Codeine Itching        REVIEW OF SYSTEMS:   5 point ROS negative except as noted above in HPI, including Gen., Resp., CV, GI &  system review.    PHYSICAL EXAM:   BP (!) 152/98   Pulse 68   Temp 97.6  F (36.4  C) (Temporal)   Resp 15   Ht 1.6 m (5' 3\")   Wt 72.4 kg (159 lb 9.8 oz)   LMP 04/03/2004   SpO2 98%   BMI 28.27 kg/m   Estimated body mass index is 28.27 kg/m  as calculated from the following:    Height as of this encounter: 1.6 m (5' 3\").    Weight as of this encounter: 72.4 kg (159 lb 9.8 oz).   GENERAL APPEARANCE: alert, and oriented  MENTAL STATUS: alert  AIRWAY EXAM: Mallampatti Class II (visualization of the soft palate, fauces, and uvula)  RESP: lungs clear to auscultation - no rales, rhonchi or wheezes  CV: regular rates and rhythm  DIAGNOSTICS:    Not indicated    IMPRESSION   ASA Class 2 - Mild systemic disease    PLAN:   Plan for Esophagogastroduodenoscopy with possible biopsy, possible dilation, possible foreign body removal. We discussed the risks, benefits and alternatives and the patient wished to proceed.    The above has been " forwarded to the consulting provider.      Signed Electronically by: Sarmad Villanueva MD  April 14, 2025

## (undated) DEVICE — BAG CLEAR TRASH 1.3M 39X33" P4040C

## (undated) DEVICE — LINEN FULL SHEET 5511

## (undated) DEVICE — HEADREST FOAM 9" PINK

## (undated) DEVICE — SOLUTION WATER 1000ML R5000-01

## (undated) DEVICE — DEVICE RETRIEVAL ROTH NET 3.89MMX160CM 00711155

## (undated) DEVICE — PACK T&A RIDGES

## (undated) DEVICE — GLOVE BIOGEL PI SZ 6.5 40865

## (undated) DEVICE — LINEN HALF SHEET 5512

## (undated) DEVICE — SOLUTION IRRIGATION 0.9% NACL 1000ML R5200-01

## (undated) RX ORDER — FENTANYL CITRATE 50 UG/ML
INJECTION, SOLUTION INTRAMUSCULAR; INTRAVENOUS
Status: DISPENSED
Start: 2025-04-14